# Patient Record
Sex: FEMALE | Race: AMERICAN INDIAN OR ALASKA NATIVE | NOT HISPANIC OR LATINO | URBAN - NONMETROPOLITAN AREA
[De-identification: names, ages, dates, MRNs, and addresses within clinical notes are randomized per-mention and may not be internally consistent; named-entity substitution may affect disease eponyms.]

---

## 2017-08-11 ENCOUNTER — FOLLOW UP ESTABLISHED (OUTPATIENT)
Dept: URBAN - NONMETROPOLITAN AREA CLINIC 3 | Facility: CLINIC | Age: 27
End: 2017-08-11
Payer: COMMERCIAL

## 2017-08-11 PROCEDURE — 92310 CONTACT LENS FITTING OU: CPT | Performed by: OPTOMETRIST

## 2017-08-11 PROCEDURE — 92015 DETERMINE REFRACTIVE STATE: CPT | Performed by: OPTOMETRIST

## 2017-08-11 PROCEDURE — 92014 COMPRE OPH EXAM EST PT 1/>: CPT | Performed by: OPTOMETRIST

## 2017-08-11 ASSESSMENT — INTRAOCULAR PRESSURE
OD: 15
OS: 12

## 2017-08-11 ASSESSMENT — KERATOMETRY
OD: 42.13
OS: 42.00

## 2017-08-11 ASSESSMENT — VISUAL ACUITY
OD: 20/20
OS: 20/20

## 2018-09-21 ENCOUNTER — FOLLOW UP ESTABLISHED (OUTPATIENT)
Dept: URBAN - NONMETROPOLITAN AREA CLINIC 3 | Facility: CLINIC | Age: 28
End: 2018-09-21
Payer: COMMERCIAL

## 2018-09-21 PROCEDURE — 92310 CONTACT LENS FITTING OU: CPT | Performed by: OPTOMETRIST

## 2018-09-21 PROCEDURE — 92015 DETERMINE REFRACTIVE STATE: CPT | Performed by: OPTOMETRIST

## 2018-09-21 PROCEDURE — 92014 COMPRE OPH EXAM EST PT 1/>: CPT | Performed by: OPTOMETRIST

## 2018-09-21 ASSESSMENT — VISUAL ACUITY
OD: 20/20
OS: 20/20

## 2018-09-21 ASSESSMENT — KERATOMETRY
OD: 42.38
OS: 42.50

## 2018-09-21 ASSESSMENT — INTRAOCULAR PRESSURE
OS: 14
OD: 13

## 2019-06-27 ENCOUNTER — FOLLOW UP ESTABLISHED (OUTPATIENT)
Dept: URBAN - NONMETROPOLITAN AREA CLINIC 3 | Facility: CLINIC | Age: 29
End: 2019-06-27
Payer: COMMERCIAL

## 2019-06-27 PROCEDURE — 92310 CONTACT LENS FITTING OU: CPT | Performed by: OPTOMETRIST

## 2019-06-27 PROCEDURE — 92015 DETERMINE REFRACTIVE STATE: CPT | Performed by: OPTOMETRIST

## 2019-06-27 PROCEDURE — 92014 COMPRE OPH EXAM EST PT 1/>: CPT | Performed by: OPTOMETRIST

## 2019-06-27 ASSESSMENT — VISUAL ACUITY
OD: 20/20
OS: 20/20

## 2019-06-27 ASSESSMENT — KERATOMETRY
OS: 42.25
OD: 42.38

## 2019-06-27 ASSESSMENT — INTRAOCULAR PRESSURE
OS: 14
OD: 14

## 2020-07-07 ENCOUNTER — FOLLOW UP ESTABLISHED (OUTPATIENT)
Dept: URBAN - NONMETROPOLITAN AREA CLINIC 3 | Facility: CLINIC | Age: 30
End: 2020-07-07
Payer: COMMERCIAL

## 2020-07-07 DIAGNOSIS — H52.13 MYOPIA: Primary | ICD-10-CM

## 2020-07-07 PROCEDURE — 92015 DETERMINE REFRACTIVE STATE: CPT | Performed by: OPTOMETRIST

## 2020-07-07 PROCEDURE — 92310 CONTACT LENS FITTING OU: CPT | Performed by: OPTOMETRIST

## 2020-07-07 PROCEDURE — 92014 COMPRE OPH EXAM EST PT 1/>: CPT | Performed by: OPTOMETRIST

## 2020-07-07 ASSESSMENT — KERATOMETRY
OD: 44.13
OS: 42.50

## 2020-07-07 ASSESSMENT — VISUAL ACUITY
OS: 20/20
OD: 20/20

## 2020-07-07 ASSESSMENT — INTRAOCULAR PRESSURE
OS: 12
OD: 15

## 2021-09-15 ENCOUNTER — OFFICE VISIT (OUTPATIENT)
Dept: URBAN - NONMETROPOLITAN AREA CLINIC 3 | Facility: CLINIC | Age: 31
End: 2021-09-15
Payer: COMMERCIAL

## 2021-09-15 DIAGNOSIS — Z01.00 ENCOUNTER FOR EXAM OF EYES AND VISION W/O ABNORMAL FINDINGS: ICD-10-CM

## 2021-09-15 PROCEDURE — 92310 CONTACT LENS FITTING OU: CPT | Performed by: OPTOMETRIST

## 2021-09-15 PROCEDURE — 92012 INTRM OPH EXAM EST PATIENT: CPT | Performed by: OPTOMETRIST

## 2021-09-15 ASSESSMENT — KERATOMETRY
OS: 42.50
OD: 42.38

## 2021-09-15 ASSESSMENT — VISUAL ACUITY
OS: 20/20
OD: 20/20

## 2021-09-15 ASSESSMENT — INTRAOCULAR PRESSURE
OD: 12
OS: 13

## 2021-09-15 NOTE — IMPRESSION/PLAN
Impression: Encounter for exam of eyes and vision w/o abnormal findings: Z01.00. Plan: Discussed diagnosis in detail with patient. New glasses Rx was given today at patients request. Discussed need for re-evaluation with patient. Patient understands and accepts.

## 2022-09-14 ENCOUNTER — OFFICE VISIT (OUTPATIENT)
Dept: URBAN - NONMETROPOLITAN AREA CLINIC 3 | Facility: CLINIC | Age: 32
End: 2022-09-14
Payer: COMMERCIAL

## 2022-09-14 DIAGNOSIS — H52.13 MYOPIA, BILATERAL: Primary | ICD-10-CM

## 2022-09-14 PROCEDURE — 92310 CONTACT LENS FITTING OU: CPT | Performed by: OPTOMETRIST

## 2022-09-14 PROCEDURE — 92012 INTRM OPH EXAM EST PATIENT: CPT | Performed by: OPTOMETRIST

## 2022-09-14 ASSESSMENT — VISUAL ACUITY
OS: 20/20
OD: 20/20

## 2022-09-14 ASSESSMENT — INTRAOCULAR PRESSURE
OD: 14
OS: 15

## 2022-09-14 NOTE — IMPRESSION/PLAN
Impression: Myopia, bilateral: H52.13. Plan: Discussed diagnosis in detail with patient. New glasses  and CL Rx was given today at patients request. Discussed need for re-evaluation with patient. Patient understands and accepts.

## 2023-09-20 ENCOUNTER — OFFICE VISIT (OUTPATIENT)
Dept: URBAN - NONMETROPOLITAN AREA CLINIC 3 | Facility: CLINIC | Age: 33
End: 2023-09-20
Payer: COMMERCIAL

## 2023-09-20 DIAGNOSIS — H52.13 MYOPIA, BILATERAL: Primary | ICD-10-CM

## 2023-09-20 PROCEDURE — 92014 COMPRE OPH EXAM EST PT 1/>: CPT | Performed by: STUDENT IN AN ORGANIZED HEALTH CARE EDUCATION/TRAINING PROGRAM

## 2023-09-20 PROCEDURE — 92310 CONTACT LENS FITTING OU: CPT | Performed by: STUDENT IN AN ORGANIZED HEALTH CARE EDUCATION/TRAINING PROGRAM

## 2023-09-20 ASSESSMENT — INTRAOCULAR PRESSURE
OD: 18
OS: 18

## 2023-09-20 ASSESSMENT — VISUAL ACUITY
OD: 20/20
OS: 20/20